# Patient Record
Sex: FEMALE | Race: WHITE | NOT HISPANIC OR LATINO | URBAN - NONMETROPOLITAN AREA
[De-identification: names, ages, dates, MRNs, and addresses within clinical notes are randomized per-mention and may not be internally consistent; named-entity substitution may affect disease eponyms.]

---

## 2017-02-15 ENCOUNTER — TELEPHONE (OUTPATIENT)
Dept: FAMILY MEDICINE CLINIC | Facility: CLINIC | Age: 27
End: 2017-02-15

## 2017-03-28 RX ORDER — PREDNISONE 1 MG/1
TABLET ORAL
Qty: 21 TABLET | Refills: 0 | Status: SHIPPED | OUTPATIENT
Start: 2017-03-28 | End: 2017-08-28

## 2017-03-28 RX ORDER — VENLAFAXINE HYDROCHLORIDE 37.5 MG/1
37.5 CAPSULE, EXTENDED RELEASE ORAL DAILY
Qty: 30 CAPSULE | Refills: 5 | Status: SHIPPED | OUTPATIENT
Start: 2017-03-28 | End: 2017-08-28

## 2017-03-28 RX ORDER — NORGESTIMATE AND ETHINYL ESTRADIOL 0.25-0.035
1 KIT ORAL DAILY
Qty: 28 TABLET | Refills: 12 | Status: SHIPPED | OUTPATIENT
Start: 2017-03-28 | End: 2017-08-28

## 2017-06-23 RX ORDER — LEVETIRACETAM 750 MG/1
TABLET ORAL
Qty: 120 TABLET | Refills: 1 | Status: SHIPPED | OUTPATIENT
Start: 2017-06-23 | End: 2017-08-28 | Stop reason: SDUPTHER

## 2017-08-28 ENCOUNTER — OFFICE VISIT (OUTPATIENT)
Dept: FAMILY MEDICINE CLINIC | Facility: CLINIC | Age: 27
End: 2017-08-28

## 2017-08-28 VITALS
DIASTOLIC BLOOD PRESSURE: 80 MMHG | TEMPERATURE: 97.1 F | SYSTOLIC BLOOD PRESSURE: 120 MMHG | HEIGHT: 62 IN | BODY MASS INDEX: 27.23 KG/M2 | WEIGHT: 148 LBS

## 2017-08-28 DIAGNOSIS — K04.7 DENTAL ABSCESS: ICD-10-CM

## 2017-08-28 DIAGNOSIS — H65.02 ACUTE SEROUS OTITIS MEDIA OF LEFT EAR, RECURRENCE NOT SPECIFIED: ICD-10-CM

## 2017-08-28 DIAGNOSIS — O99.353 EPILEPSY AFFECTING PREGNANCY IN THIRD TRIMESTER (HCC): ICD-10-CM

## 2017-08-28 DIAGNOSIS — G40.909 EPILEPSY AFFECTING PREGNANCY IN THIRD TRIMESTER (HCC): ICD-10-CM

## 2017-08-28 DIAGNOSIS — R21 RASH: Primary | ICD-10-CM

## 2017-08-28 PROCEDURE — 99213 OFFICE O/P EST LOW 20 MIN: CPT | Performed by: NURSE PRACTITIONER

## 2017-08-28 RX ORDER — HYDROCODONE BITARTRATE AND ACETAMINOPHEN 7.5; 325 MG/1; MG/1
1 TABLET ORAL EVERY 6 HOURS PRN
Qty: 20 TABLET | Refills: 0 | Status: SHIPPED | OUTPATIENT
Start: 2017-08-28

## 2017-08-28 RX ORDER — LAMOTRIGINE 100 MG/1
TABLET ORAL
Qty: 120 TABLET | Refills: 5 | Status: SHIPPED | OUTPATIENT
Start: 2017-08-28

## 2017-08-28 RX ORDER — CEFDINIR 300 MG/1
300 CAPSULE ORAL 2 TIMES DAILY
Qty: 14 CAPSULE | Refills: 1 | Status: SHIPPED | OUTPATIENT
Start: 2017-08-28 | End: 2018-01-18

## 2017-08-28 RX ORDER — PERMETHRIN 50 MG/G
CREAM TOPICAL ONCE
Qty: 60 G | Refills: 1 | Status: SHIPPED | OUTPATIENT
Start: 2017-08-28 | End: 2017-08-28

## 2017-08-28 RX ORDER — IBUPROFEN 800 MG/1
TABLET ORAL
Qty: 60 TABLET | Refills: 1 | Status: SHIPPED | OUTPATIENT
Start: 2017-08-28

## 2017-08-28 RX ORDER — LEVETIRACETAM 750 MG/1
1500 TABLET ORAL 2 TIMES DAILY
Qty: 120 TABLET | Refills: 5 | Status: SHIPPED | OUTPATIENT
Start: 2017-08-28

## 2018-01-18 RX ORDER — CEPHALEXIN 500 MG/1
500 CAPSULE ORAL 3 TIMES DAILY
Qty: 21 CAPSULE | Refills: 0 | Status: SHIPPED | OUTPATIENT
Start: 2018-01-18

## 2024-10-21 ENCOUNTER — OFFICE VISIT (OUTPATIENT)
Dept: FAMILY MEDICINE CLINIC | Facility: CLINIC | Age: 34
End: 2024-10-21
Payer: COMMERCIAL

## 2024-10-21 VITALS
BODY MASS INDEX: 29.44 KG/M2 | WEIGHT: 160 LBS | SYSTOLIC BLOOD PRESSURE: 105 MMHG | HEART RATE: 60 BPM | DIASTOLIC BLOOD PRESSURE: 60 MMHG | OXYGEN SATURATION: 98 % | HEIGHT: 62 IN

## 2024-10-21 DIAGNOSIS — Z86.69 HISTORY OF EPILEPSY: ICD-10-CM

## 2024-10-21 DIAGNOSIS — M79.641 BILATERAL HAND PAIN: Primary | ICD-10-CM

## 2024-10-21 DIAGNOSIS — Z87.898 HISTORY OF SEIZURE: ICD-10-CM

## 2024-10-21 DIAGNOSIS — M79.642 BILATERAL HAND PAIN: Primary | ICD-10-CM

## 2024-10-21 PROCEDURE — 1126F AMNT PAIN NOTED NONE PRSNT: CPT | Performed by: STUDENT IN AN ORGANIZED HEALTH CARE EDUCATION/TRAINING PROGRAM

## 2024-10-21 PROCEDURE — 99204 OFFICE O/P NEW MOD 45 MIN: CPT | Performed by: STUDENT IN AN ORGANIZED HEALTH CARE EDUCATION/TRAINING PROGRAM

## 2024-10-21 RX ORDER — LEVETIRACETAM 500 MG/1
500 TABLET ORAL 2 TIMES DAILY
Qty: 60 TABLET | Refills: 1 | Status: SHIPPED | OUTPATIENT
Start: 2024-10-21

## 2024-10-21 RX ORDER — BUPRENORPHINE HYDROCHLORIDE AND NALOXONE HYDROCHLORIDE DIHYDRATE 8; 2 MG/1; MG/1
TABLET SUBLINGUAL
COMMUNITY
Start: 2024-10-08

## 2024-10-21 NOTE — PROGRESS NOTES
Subjective:       Dafne Dunbar is a 34 y.o. female who presents to discuss hand pain, numbness, and tingling.    Ms. Dunbar reports bilateral hand pain, numbness, and tingling.  This began approximately 6 months ago but is worsened.  Now she feels it in her elbows.  Phalen test was conducted and was positive, suspicious for cubital tunnel syndrome.  Will obtain EMG testing.  Advised her to wear nighttime splint.  Would check thyroid studies and vitamin B12 and folate to look at other potential causes of numbness and tingling.  Would see back in 6 weeks.    Ms. Dunbar reports history of epilepsy.  Chart review shows she was previously on Lamictal and Keppra.  Previous neurologist was Dr. Brown in [place].However she felt fatigued on these medications and weaned herself off years ago.  She has not been on any seizure medications for quite some time but now is worried about the potential for recurrent epilepsy.  She request to be restarted on Keppra.  Would restart Keppra at 500 mg twice daily, obtain EEG testing and refer to establish with a neurologist here.  Follow-up in 6 weeks.      The following portions of the patient's history were reviewed and updated as appropriate: allergies, current medications, past family history, past medical history, past social history, past surgical history, and problem list.    Past Medical Hx:  Past Medical History:   Diagnosis Date    Acquired hypothyroidism     Acute bronchitis     Acute sinusitis     Backache     Epilepsy     Generalized convulsive epilepsy     Headache     Infestation by Sarcoptes scabiei suha hominis     Otitis media     Seizures     Serous otitis media     Toothache     Upper abdominal pain, unspecified        Past Surgical Hx:  Past Surgical History:   Procedure Laterality Date     SECTION  2015    Repeat low transverse  section.    DILATATION AND CURETTAGE  2011    Repeat low transverse C section & scar removal     "INJECTION OF MEDICATION  06/22/2016    Depo Medrol (Methylprednisone) 80mg (1)      INJECTION OF MEDICATION  06/22/2016    Rocephin (1)      TONSILLECTOMY         Current Meds:    Current Outpatient Medications:     buprenorphine-naloxone (SUBOXONE) 8-2 MG per SL tablet, PLACE 2 TABLET SL ONCE A DAY AS DIRECTED, Disp: , Rfl:     levETIRAcetam (Keppra) 500 MG tablet, Take 1 tablet by mouth 2 (Two) Times a Day., Disp: 60 tablet, Rfl: 1    Allergies:  No Known Allergies    Family Hx:  History reviewed. No pertinent family history.     Social History:  Social History     Socioeconomic History    Marital status: Single   Tobacco Use    Smoking status: Former     Types: Cigarettes    Smokeless tobacco: Never   Substance and Sexual Activity    Alcohol use: No    Drug use: No    Sexual activity: Yes       Review of Systems  Review of Systems   Musculoskeletal:         +hand pain (bilateral)        Objective:     /60   Pulse 60   Ht 157.5 cm (62\")   Wt 72.6 kg (160 lb)   SpO2 98%   Breastfeeding No   BMI 29.26 kg/m²   Physical Exam  Constitutional:       General: She is not in acute distress.     Appearance: Normal appearance. She is not ill-appearing, toxic-appearing or diaphoretic.   Pulmonary:      Effort: Pulmonary effort is normal. No respiratory distress.   Neurological:      Mental Status: She is alert.   Psychiatric:         Mood and Affect: Mood normal.         Behavior: Behavior normal.          Assessment/Plan:     Diagnoses and all orders for this visit:    1. Bilateral hand pain (Primary)    Ms. Dunbar reports bilateral hand pain, numbness, and tingling.  This began approximately 6 months ago but is worsened.  Now she feels it in her elbows.  Phalen test was conducted and was positive, suspicious for cubital tunnel syndrome.  Will obtain EMG testing.  Advised her to wear nighttime splint.  Would check thyroid studies and vitamin B12 and folate to look at other potential causes of numbness and " tingling.  Would see back in 6 weeks.      -     EMG & Nerve Conduction Test; Future  -     Vitamin B12; Future  -     Folate; Future  -     CBC No Differential; Future  -     Comprehensive metabolic panel; Future  -     TSH+Free T4; Future    2. History of epilepsy  3. History of seizure       Ms. Dunbar reports history of epilepsy.  Chart review shows she was previously on Lamictal and Keppra.  Previous neurologist was Dr. Brown in [place].However she felt fatigued on these medications and weaned herself off years ago.  She has not been on any seizure medications for quite some time but now is worried about the potential for recurrent epilepsy.  She request to be restarted on Keppra.  Would restart Keppra at 500 mg twice daily, obtain EEG testing and refer to establish with a neurologist here.  Follow-up in 6 weeks.    -     EEG; Future  -     Ambulatory Referral to Neurology  -     levETIRAcetam (Keppra) 500 MG tablet; Take 1 tablet by mouth 2 (Two) Times a Day.  Dispense: 60 tablet; Refill: 1              Rx changes: Starting Keppra 500 mg twice daily    Follow-up:     Return in about 6 weeks (around 12/2/2024) for Follow-up on hand pain and epilepsy.    Preventative:  Health Maintenance   Topic Date Due    BMI FOLLOWUP  Never done    ANNUAL PHYSICAL  Never done    PAP SMEAR  11/01/2019    COVID-19 Vaccine (1 - 2023-24 season) 10/23/2024 (Originally 9/1/2024)    INFLUENZA VACCINE  03/31/2025 (Originally 8/1/2024)    Pneumococcal Vaccine 0-64 (1 of 2 - PCV) 10/21/2025 (Originally 10/5/1997)    TDAP/TD VACCINES (2 - Tdap) 10/21/2025 (Originally 8/13/2013)    HEPATITIS C SCREENING  Completed         This document has been electronically signed by Gm Rodríguez MD on October 21, 2024 11:11 EDT       Parts of this note are electronic transcriptions/translations of spoken language to printed text using the Dragon Dictation system.

## 2024-11-13 ENCOUNTER — APPOINTMENT (OUTPATIENT)
Dept: NEUROLOGY | Facility: HOSPITAL | Age: 34
End: 2024-11-13
Payer: COMMERCIAL

## 2024-11-13 ENCOUNTER — HOSPITAL ENCOUNTER (OUTPATIENT)
Facility: HOSPITAL | Age: 34
Discharge: HOME | End: 2024-11-13
Admitting: STUDENT IN AN ORGANIZED HEALTH CARE EDUCATION/TRAINING PROGRAM
Payer: COMMERCIAL

## 2024-11-13 DIAGNOSIS — M79.642 BILATERAL HAND PAIN: ICD-10-CM

## 2024-11-13 DIAGNOSIS — M79.641 BILATERAL HAND PAIN: ICD-10-CM

## 2024-11-13 PROCEDURE — 95910 NRV CNDJ TEST 7-8 STUDIES: CPT

## 2024-11-13 PROCEDURE — 95886 MUSC TEST DONE W/N TEST COMP: CPT

## 2024-11-15 DIAGNOSIS — G56.03 BILATERAL CARPAL TUNNEL SYNDROME: Primary | ICD-10-CM

## 2024-11-15 NOTE — PROGRESS NOTES
Patient has evidence of bilateral carpal tunnel.  If she is agreeable, I would refer her to orthopedic surgery.    Thank you,    Gm Rodríguez    ?  This document has been electronically signed by Gm Rodríguez MD on November 15, 2024 12:55 EST